# Patient Record
Sex: FEMALE | Race: BLACK OR AFRICAN AMERICAN | NOT HISPANIC OR LATINO | Employment: FULL TIME | URBAN - NONMETROPOLITAN AREA
[De-identification: names, ages, dates, MRNs, and addresses within clinical notes are randomized per-mention and may not be internally consistent; named-entity substitution may affect disease eponyms.]

---

## 2023-01-24 ENCOUNTER — HOSPITAL ENCOUNTER (EMERGENCY)
Facility: HOSPITAL | Age: 28
Discharge: HOME | End: 2023-01-24
Attending: EMERGENCY MEDICINE

## 2023-01-24 ENCOUNTER — APPOINTMENT (EMERGENCY)
Dept: CT IMAGING | Facility: HOSPITAL | Age: 28
End: 2023-01-24

## 2023-01-24 VITALS
BODY MASS INDEX: 34.99 KG/M2 | DIASTOLIC BLOOD PRESSURE: 69 MMHG | OXYGEN SATURATION: 99 % | SYSTOLIC BLOOD PRESSURE: 127 MMHG | TEMPERATURE: 97.6 F | HEART RATE: 57 BPM | HEIGHT: 65 IN | WEIGHT: 210 LBS | RESPIRATION RATE: 16 BRPM

## 2023-01-24 DIAGNOSIS — R07.9 CHEST PAIN: ICD-10-CM

## 2023-01-24 DIAGNOSIS — K11.21 PAROTITIS, ACUTE: Primary | ICD-10-CM

## 2023-01-24 DIAGNOSIS — R19.7 DIARRHEA, UNSPECIFIED TYPE: ICD-10-CM

## 2023-01-24 DIAGNOSIS — K08.9 DENTAL DISEASE: ICD-10-CM

## 2023-01-24 LAB
ALBUMIN SERPL BCP-MCNC: 4 G/DL (ref 3.5–5)
ALP SERPL-CCNC: 37 U/L (ref 34–104)
ALT SERPL W P-5'-P-CCNC: 12 U/L (ref 7–52)
ANION GAP SERPL CALCULATED.3IONS-SCNC: 5 MMOL/L (ref 4–13)
AST SERPL W P-5'-P-CCNC: 15 U/L (ref 13–39)
ATRIAL RATE: 59 BPM
BASOPHILS # BLD AUTO: 0.05 THOUSANDS/ÂΜL (ref 0–0.1)
BASOPHILS NFR BLD AUTO: 1 % (ref 0–1)
BILIRUB DIRECT SERPL-MCNC: 0.09 MG/DL (ref 0–0.2)
BILIRUB SERPL-MCNC: 0.36 MG/DL (ref 0.2–1)
BUN SERPL-MCNC: 11 MG/DL (ref 5–25)
CALCIUM SERPL-MCNC: 8.9 MG/DL (ref 8.4–10.2)
CARDIAC TROPONIN I PNL SERPL HS: <2 NG/L
CHLORIDE SERPL-SCNC: 108 MMOL/L (ref 96–108)
CO2 SERPL-SCNC: 28 MMOL/L (ref 21–32)
CREAT SERPL-MCNC: 0.8 MG/DL (ref 0.6–1.3)
CRP SERPL QL: 1.1 MG/L
EOSINOPHIL # BLD AUTO: 0.21 THOUSAND/ÂΜL (ref 0–0.61)
EOSINOPHIL NFR BLD AUTO: 2 % (ref 0–6)
ERYTHROCYTE [DISTWIDTH] IN BLOOD BY AUTOMATED COUNT: 13.9 % (ref 11.6–15.1)
ERYTHROCYTE [SEDIMENTATION RATE] IN BLOOD: 4 MM/HOUR (ref 0–19)
FLUAV RNA RESP QL NAA+PROBE: NEGATIVE
FLUBV RNA RESP QL NAA+PROBE: NEGATIVE
GFR SERPL CREATININE-BSD FRML MDRD: 99 ML/MIN/1.73SQ M
GLUCOSE SERPL-MCNC: 102 MG/DL (ref 65–140)
HCT VFR BLD AUTO: 36.8 % (ref 34.8–46.1)
HGB BLD-MCNC: 11.9 G/DL (ref 11.5–15.4)
IMM GRANULOCYTES # BLD AUTO: 0.03 THOUSAND/UL (ref 0–0.2)
IMM GRANULOCYTES NFR BLD AUTO: 0 % (ref 0–2)
LIPASE SERPL-CCNC: 10 U/L (ref 11–82)
LYMPHOCYTES # BLD AUTO: 3.78 THOUSANDS/ÂΜL (ref 0.6–4.47)
LYMPHOCYTES NFR BLD AUTO: 37 % (ref 14–44)
MAGNESIUM SERPL-MCNC: 1.9 MG/DL (ref 1.9–2.7)
MCH RBC QN AUTO: 29.3 PG (ref 26.8–34.3)
MCHC RBC AUTO-ENTMCNC: 32.3 G/DL (ref 31.4–37.4)
MCV RBC AUTO: 91 FL (ref 82–98)
MONOCYTES # BLD AUTO: 0.81 THOUSAND/ÂΜL (ref 0.17–1.22)
MONOCYTES NFR BLD AUTO: 8 % (ref 4–12)
NEUTROPHILS # BLD AUTO: 5.45 THOUSANDS/ÂΜL (ref 1.85–7.62)
NEUTS SEG NFR BLD AUTO: 52 % (ref 43–75)
NRBC BLD AUTO-RTO: 0 /100 WBCS
P AXIS: 63 DEGREES
PLATELET # BLD AUTO: 314 THOUSANDS/UL (ref 149–390)
PMV BLD AUTO: 9.4 FL (ref 8.9–12.7)
POTASSIUM SERPL-SCNC: 3.3 MMOL/L (ref 3.5–5.3)
PR INTERVAL: 170 MS
PROT SERPL-MCNC: 6.4 G/DL (ref 6.4–8.4)
QRS AXIS: 30 DEGREES
QRSD INTERVAL: 84 MS
QT INTERVAL: 434 MS
QTC INTERVAL: 429 MS
RBC # BLD AUTO: 4.06 MILLION/UL (ref 3.81–5.12)
RSV RNA RESP QL NAA+PROBE: NEGATIVE
SARS-COV-2 RNA RESP QL NAA+PROBE: NEGATIVE
SODIUM SERPL-SCNC: 141 MMOL/L (ref 135–147)
T WAVE AXIS: 29 DEGREES
VENTRICULAR RATE: 59 BPM
WBC # BLD AUTO: 10.33 THOUSAND/UL (ref 4.31–10.16)

## 2023-01-24 RX ORDER — METRONIDAZOLE 500 MG/1
500 TABLET ORAL EVERY 8 HOURS SCHEDULED
Qty: 30 TABLET | Refills: 0 | Status: SHIPPED | OUTPATIENT
Start: 2023-01-24 | End: 2023-02-03

## 2023-01-24 RX ORDER — CEFUROXIME AXETIL 500 MG/1
500 TABLET ORAL EVERY 12 HOURS SCHEDULED
Qty: 20 TABLET | Refills: 0 | Status: SHIPPED | OUTPATIENT
Start: 2023-01-24 | End: 2023-02-03

## 2023-01-24 RX ORDER — METRONIDAZOLE 500 MG/100ML
500 INJECTION, SOLUTION INTRAVENOUS ONCE
Status: COMPLETED | OUTPATIENT
Start: 2023-01-24 | End: 2023-01-24

## 2023-01-24 RX ORDER — POTASSIUM CHLORIDE 20 MEQ/1
20 TABLET, EXTENDED RELEASE ORAL ONCE
Status: COMPLETED | OUTPATIENT
Start: 2023-01-24 | End: 2023-01-24

## 2023-01-24 RX ORDER — PREDNISONE 20 MG/1
TABLET ORAL
Qty: 17 TABLET | Refills: 0 | Status: SHIPPED | OUTPATIENT
Start: 2023-01-24 | End: 2023-02-04

## 2023-01-24 RX ORDER — PREDNISONE 20 MG/1
60 TABLET ORAL ONCE
Status: COMPLETED | OUTPATIENT
Start: 2023-01-24 | End: 2023-01-24

## 2023-01-24 RX ORDER — CEFTRIAXONE 2 G/50ML
2000 INJECTION, SOLUTION INTRAVENOUS ONCE
Status: COMPLETED | OUTPATIENT
Start: 2023-01-24 | End: 2023-01-24

## 2023-01-24 RX ADMIN — IOHEXOL 85 ML: 350 INJECTION, SOLUTION INTRAVENOUS at 05:17

## 2023-01-24 RX ADMIN — CEFTRIAXONE 2000 MG: 2 INJECTION, SOLUTION INTRAVENOUS at 05:18

## 2023-01-24 RX ADMIN — METRONIDAZOLE 500 MG: 500 INJECTION, SOLUTION INTRAVENOUS at 06:06

## 2023-01-24 RX ADMIN — POTASSIUM CHLORIDE 20 MEQ: 1500 TABLET, EXTENDED RELEASE ORAL at 06:16

## 2023-01-24 RX ADMIN — PREDNISONE 60 MG: 20 TABLET ORAL at 06:16

## 2023-01-24 NOTE — ED PROVIDER NOTES
History  Chief Complaint   Patient presents with   • Facial Swelling     Pt has left lower sided swelling to jaw and neck starting around 0130  Pt also has right ear pain, headache  • Abdominal Pain     Pt started with periumbillical pain and had diarrhea with some nausea starting yesterday     HPI  27F presenting with left facial swelling  Yesterday, patient noticed headche to right side and right ear ache  This morning at around 1am, patient noticed swelling to the left side of her face  Endorses tightness behind both of her ears  Says she has dental issues for years with broken teeth and infections in the past  Has not seen a dentist in years  Denies fevers, sore throat, odynophagia  Yesterday, patient noticed upper abdominal pain  Associated w episodes of diarrhea throughout the day  +nausea  Denies vomiting  Hx of appendectomy and   LMP 23  History reviewed  No pertinent past medical history  Past Surgical History:   Procedure Laterality Date   • APPENDECTOMY     •  SECTION         History reviewed  No pertinent family history  I have reviewed and agree with the history as documented  E-Cigarette/Vaping   • E-Cigarette Use Never User      E-Cigarette/Vaping Substances     Social History     Tobacco Use   • Smoking status: Every Day     Types: Cigars   • Smokeless tobacco: Never   Vaping Use   • Vaping Use: Never used   Substance Use Topics   • Alcohol use: Not Currently   • Drug use: Yes     Types: Marijuana       Review of Systems   Constitutional: Negative for chills and fever  HENT: Positive for dental problem, ear pain and facial swelling  Negative for sore throat  Eyes: Negative for pain and visual disturbance  Respiratory: Negative for cough and shortness of breath  Cardiovascular: Negative for chest pain and palpitations  Gastrointestinal: Positive for abdominal pain, diarrhea and nausea  Negative for vomiting     Genitourinary: Negative for dysuria and hematuria  Musculoskeletal: Negative for arthralgias and back pain  Skin: Negative for color change and rash  Neurological: Negative for seizures and syncope  All other systems reviewed and are negative  Physical Exam  Physical Exam  Vitals and nursing note reviewed  Constitutional:       General: She is not in acute distress  Appearance: She is well-developed  HENT:      Head: Normocephalic and atraumatic  Comments: Left facial swelling; no induration or erythema  No purulence expressed from left Maryan's duct  Right Ear: External ear normal  No mastoid tenderness  Tympanic membrane is not injected  Left Ear: External ear normal  No mastoid tenderness  Tympanic membrane is not injected  Nose: Nose normal       Mouth/Throat:      Dentition: Abnormal dentition  Dental caries present  Comments: Floor of mouth is soft  Eyes:      Conjunctiva/sclera: Conjunctivae normal    Cardiovascular:      Rate and Rhythm: Normal rate and regular rhythm  Pulmonary:      Effort: Pulmonary effort is normal  No respiratory distress  Breath sounds: Normal breath sounds  Abdominal:      Palpations: Abdomen is soft  Tenderness: There is abdominal tenderness in the epigastric area  Musculoskeletal:      Cervical back: Normal range of motion and neck supple  Skin:     General: Skin is warm and dry  Neurological:      General: No focal deficit present  Mental Status: She is alert  Mental status is at baseline           Vital Signs  ED Triage Vitals [01/24/23 0357]   Temperature Pulse Respirations Blood Pressure SpO2   97 6 °F (36 4 °C) 74 18 116/68 99 %      Temp Source Heart Rate Source Patient Position - Orthostatic VS BP Location FiO2 (%)   Temporal Monitor Sitting Right arm --      Pain Score       No Pain           Vitals:    01/24/23 0357 01/24/23 0608 01/24/23 0700   BP: 116/68 115/58 127/69   Pulse: 74 56 57   Patient Position - Orthostatic VS: Sitting Lying Visual Acuity      ED Medications  Medications   cefTRIAXone (ROCEPHIN) IVPB (premix in dextrose) 2,000 mg 50 mL (0 mg Intravenous Stopped 1/24/23 0606)   metroNIDAZOLE (FLAGYL) IVPB (premix) 500 mg 100 mL (0 mg Intravenous Stopped 1/24/23 0636)   iohexol (OMNIPAQUE) 350 MG/ML injection (SINGLE-DOSE) 85 mL (85 mL Intravenous Given 1/24/23 0517)   potassium chloride (K-DUR,KLOR-CON) CR tablet 20 mEq (20 mEq Oral Given 1/24/23 0616)   predniSONE tablet 60 mg (60 mg Oral Given 1/24/23 0616)       Diagnostic Studies  Results Reviewed     Procedure Component Value Units Date/Time    Magnesium [[id number]]  (Normal) Collected: 01/24/23 0425    Lab Status: Final result Specimen: Blood from Arm, Right Updated: 01/24/23 9790     Magnesium 1 9 mg/dL     HS Troponin 0hr (reflex protocol) [[id number]]  (Normal) Collected: 01/24/23 0525    Lab Status: Final result Specimen: Blood from Arm, Right Updated: 01/24/23 0555     hs TnI 0hr <2 ng/L     FLU/RSV/COVID - if FLU/RSV clinically relevant [[id number]]  (Normal) Collected: 01/24/23 0425    Lab Status: Final result Specimen: Nares from Nose Updated: 01/24/23 0510     SARS-CoV-2 Negative     INFLUENZA A PCR Negative     INFLUENZA B PCR Negative     RSV PCR Negative    Narrative:      FOR PEDIATRIC PATIENTS - copy/paste COVID Guidelines URL to browser: [email or web address]    SARS-CoV-2 assay is a Nucleic Acid Amplification assay intended for the  qualitative detection of nucleic acid from SARS-CoV-2 in nasopharyngeal  swabs  Results are for the presumptive identification of SARS-CoV-2 RNA  Positive results are indicative of infection with SARS-CoV-2, the virus  causing COVID-19, but do not rule out bacterial infection or co-infection  with other viruses  Laboratories within the [place] and its  territories are required to report all positive results to the appropriate  public health authorities   Negative results do not preclude SARS-CoV-2  infection and should not be used as the sole basis for treatment or other  patient management decisions  Negative results must be combined with  clinical observations, patient history, and epidemiological information  This test has not been FDA cleared or approved  This test has been authorized by FDA under an Emergency Use Authorization  (EUA)  This test is only authorized for the duration of time the  declaration that circumstances exist justifying the authorization of the  emergency use of an in vitro diagnostic tests for detection of SARS-CoV-2  virus and/or diagnosis of COVID-19 infection under section 564(b)(1) of  the Act, 21 U  S C  032EXH-2(P)(2), unless the authorization is terminated  or revoked sooner  The test has been validated but independent review by FDA  and CLIA is pending  Test performed using [employer] GeneXpert: This RT-PCR assay targets N2,  a region unique to SARS-CoV-2  A conserved region in the E-gene was chosen  for pan-Sarbecovirus detection which includes SARS-CoV-2  According to CMS-2020-01-R, this platform meets the definition of high-throughput technology      Basic metabolic panel [[id number]]  (Abnormal) Collected: 01/24/23 0425    Lab Status: Final result Specimen: Blood from Arm, Right Updated: 01/24/23 0458     Sodium 141 mmol/L      Potassium 3 3 mmol/L      Chloride 108 mmol/L      CO2 28 mmol/L      ANION GAP 5 mmol/L      BUN 11 mg/dL      Creatinine 0 80 mg/dL      Glucose 102 mg/dL      Calcium 8 9 mg/dL      eGFR 99 ml/min/1 73sq m     Narrative:      Kimberly guidelines for Chronic Kidney Disease (CKD):   •  Stage 1 with normal or high GFR (GFR > 90 mL/min/1 73 square meters)  •  Stage 2 Mild CKD (GFR = 60-89 mL/min/1 73 square meters)  •  Stage 3A Moderate CKD (GFR = 45-59 mL/min/1 73 square meters)  •  Stage 3B Moderate CKD (GFR = 30-44 mL/min/1 73 square meters)  •  Stage 4 Severe CKD (GFR = 15-29 mL/min/1 73 square meters)  •  Stage 5 End Stage CKD (GFR <15 mL/min/1 73 square meters)  Note: GFR calculation is accurate only with a steady state creatinine    Hepatic function panel [[id number]]  (Normal) Collected: 01/24/23 0425    Lab Status: Final result Specimen: Blood from Arm, Right Updated: 01/24/23 0456     Total Bilirubin 0 36 mg/dL      Bilirubin, Direct 0 09 mg/dL      Alkaline Phosphatase 37 U/L      AST 15 U/L      ALT 12 U/L      Total Protein 6 4 g/dL      Albumin 4 0 g/dL     Lipase [[id number]]  (Abnormal) Collected: 01/24/23 0425    Lab Status: Final result Specimen: Blood from Arm, Right Updated: 01/24/23 0456     Lipase 10 u/L     C-reactive protein [[id number]]  (Normal) Collected: 01/24/23 0425    Lab Status: Final result Specimen: Blood from Arm, Right Updated: 01/24/23 0456     CRP 1 1 mg/L     Sedimentation rate, automated [[id number]]  (Normal) Collected: 01/24/23 0425    Lab Status: Final result Specimen: Blood from Arm, Right Updated: 01/24/23 0443     Sed Rate 4 mm/hour     CBC and differential [[id number]]  (Abnormal) Collected: 01/24/23 0425    Lab Status: Final result Specimen: Blood from Arm, Right Updated: 01/24/23 0436     WBC 10 33 Thousand/uL      RBC 4 06 Million/uL      Hemoglobin 11 9 g/dL      Hematocrit 36 8 %      MCV 91 fL      MCH 29 3 pg      MCHC 32 3 g/dL      RDW 13 9 %      MPV 9 4 fL      Platelets 436 Thousands/uL      nRBC 0 /100 WBCs      Neutrophils Relative 52 %      Immat GRANS % 0 %      Lymphocytes Relative 37 %      Monocytes Relative 8 %      Eosinophils Relative 2 %      Basophils Relative 1 %      Neutrophils Absolute 5 45 Thousands/µL      Immature Grans Absolute 0 03 Thousand/uL      Lymphocytes Absolute 3 78 Thousands/µL      Monocytes Absolute 0 81 Thousand/µL      Eosinophils Absolute 0 21 Thousand/µL      Basophils Absolute 0 05 Thousands/µL                CT soft tissue neck   Final Result by Demond Armstrong MD (01/24 0550)      Abnormal findings compatible with acute left parotitis  No abscess  Incidental finding of minimal multifocal maxillary and mandibular dental disease  Nonemergent dental evaluation is suggested  This study demonstrates a significant  finding and was documented as such in [facility] for liaison and referring practitioner notification  Workstation performed: [id number]                  Procedures  ECG 12 Lead Documentation Only    Date/Time: 1/24/2023 5:22 AM  Performed by: Sommer Wang MD  Authorized by: Sommer Wang MD     Interpretation:     Interpretation: normal    Rate:     ECG rate:  59    ECG rate assessment: bradycardic    Rhythm:     Rhythm: sinus rhythm    Ectopy:     Ectopy: none    QRS:     QRS axis:  Normal  Conduction:     Conduction: normal    ST segments:     ST segments:  Normal  T waves:     T waves: normal         ED Course  ED Course as of 01/24/23 0731   Tue Jan 24, 2023   0437 WBC(!): 10 33   0437 Hemoglobin: 11 9   0445 Sed Rate: 4   0454 Patient reevaluated  She is now complaining of chest heaviness  EKG w/o acute ischemic abnl  Will add on troponin  0457 Potassium(!): 3 3   0457 C-REACTIVE PROTEIN: 1 1   0551 CT Neck  IMPRESSION:  Abnormal findings compatible with acute left parotitis  No abscess      Incidental finding of minimal multifocal maxillary and mandibular dental disease  Nonemergent dental evaluation is suggested  0559 hs TnI 0hr: <2   [id number] I discussed with Dr Flower Palacio, ENT on-call  Recommends antibiotics and course of steroids for parotitis  Will see her in the office on Friday  Medical Decision Making  27F presenting with left facial swelling and abdominal pain  No stridor, no wheezing  Able to speak clearly  Tolerates own secretions  Floor of mouth is soft  No trismus  Patient has multiple dental caries, gingivitis, and poor dentition  Empirically initiated IV antibiotics for dental infection  CT neck obtained to eval for etiology of facial swelling and extent of infection  WBC minimally elevated at 10 33, Hgb wnl  CMP significant for mild hypokalemia 3 3  Normal ESR and CRP  COVID/Flu/RSV negative  CT neck significant for acute parotitis and multifocal dental disease  I discussed acute parotitis with on-call ENT; he recommended antibiotics and steroid taper and plans to see her in the office  As for the dental disease, referral to OMFS was placed and information was given for dental clinics  As for abd pain, abdomen was soft with mild tenderness in epigastrum; no peritoneal signs  WBC and CMP were reassuring  Suspect gastroenteritis related to pain and diarrhea  Patient did have intermittent episode of chest heaviness while in the ED; EKG did not show acute ischemic abnl and troponin normal at <2  Her chest heaviness resolved  Patient was able to tolerate po while in the ED  Patient comfortable with discharge  Strict return precautions were discussed  Dental disease: acute illness or injury  Parotitis, acute: acute illness or injury  Amount and/or Complexity of Data Reviewed  Labs: ordered  Decision-making details documented in ED Course  Radiology: ordered  ECG/medicine tests: ordered and independent interpretation performed  Risk  Prescription drug management  Disposition  Final diagnoses:   Parotitis, acute   Dental disease     Time reflects when diagnosis was documented in both MDM as applicable and the Disposition within this note     Time User Action Codes Description Comment    1/24/2023  5:52 AM Antonia Phalen Add [K11 21] Parotitis, acute     1/24/2023  6:17 AM Antonia Phalen Add [K08 9] Dental disease       ED Disposition     ED Disposition   Discharge    Condition   Stable    Date/Time   Tue Jan 24, 2023  6:17 AM    Comment   Nayana Walters discharge to home/self care                 Follow-up Information     Follow up With Specialties Details Why Jonny Greenberg MD Otolaryngology Go in 3 days  [address] [id number] [place] [address] [place]asserman  [phone]            Discharge Medication List as of 1/24/2023  6:37 AM      START taking these medications    Details   cefuroxime (CEFTIN) 500 mg tablet Take 1 tablet (500 mg total) by mouth every 12 (twelve) hours for 10 days, Starting Tue 1/24/2023, Until Fri 2/3/2023, Normal      metroNIDAZOLE (FLAGYL) 500 mg tablet Take 1 tablet (500 mg total) by mouth every 8 (eight) hours for 10 days, Starting Tue 1/24/2023, Until Fri 2/3/2023, Normal      predniSONE 20 mg tablet Multiple Dosages:Starting Tue 1/24/2023, Until Wed 1/25/2023 at 2359, THEN Starting Thu 1/26/2023, Until Sat 1/28/2023 at 2359, THEN Starting Sun 1/29/2023, Until Tue 1/31/2023 at 2359, THEN Starting Wed 2/1/2023, Until Fri 2/3/2023 at 2359Take 3 ta blets (60 mg total) by mouth daily for 2 days, THEN 2 tablets (40 mg total) daily for 3 days, THEN 1 tablet (20 mg total) daily for 3 days, THEN 0 5 tablets (10 mg total) daily for 3 days  , Normal                 PDMP Review     None          ED Provider  Electronically Signed by           Gisele Kennedy MD  01/24/23 0800

## 2023-01-24 NOTE — DISCHARGE INSTRUCTIONS
Referral have been placed for ENT, Dr Marco Licea, and he plans to follow-up with you in the office on Friday  Referral has also been placed for oral-maxillofacial surgery (OMFS) to see for your teeth  You received your FIRST dose of antibiotics and steroids today  You received ceftriaxone, therefore you do NOT need to start taking the CEFTIN until tomorrow  You SHOULD take the FLAGYL today for two more times (it is to be taken THREE times a day)  You also do NOT need to start taking the PREDNISONE until tomorrow  If your facial swelling worsens, or you have difficulty breathing or speaking, then please return to the ED  Do NOT drink alcohol while you are on the antibiotics  Below is a list of dental clinics that you can follow-up with your teeth     -------------------------------------    Here is a list of  dental clinics that may be able to help you  Keep in mind that these clinics do not have to see you or any other patient  Also, these clinics are not connected to the [facility] or [address] [place] [address] system but if they agree to see you as a patient it is easy for them to call  Medical Records Department to have your records faxed to them        [facility]:  [address] [facility] 130[address] [facility]   629 [facility]   [id number]:   1 [facility]   [address] [facility], [address] [place] [address]   [phone]     [facility]:  [address] [facility], 1000 [facility]  [phone]    [address]:  [address] [place], 76 [facility]  [phone]    [address]:  45 [facility], [address]  ([place]:  [address] [facility], 1310 [facility]  [phone]    [facility]:  100 [facility], 520 [facility]  [phone]    [address]:  [address] [facility] 74  [phone]    [facility]:  [facility] [place], [address] [facility]  [phone]    [facility]  [address]   [phone]    [address] [place] [address] Street:  [address] [place] [address]  21  Associates:  [address] [place] [address],[address]  [phone]

## 2023-01-24 NOTE — Clinical Note
Betty Atkinson was seen and treated in our emergency department on 1/24/2023  Diagnosis:     Nayana  may return to work on return date  She may return on this date: 01/25/2023         If you have any questions or concerns, please don't hesitate to call        Alexandre Richards MD    ______________________________           _______________          _______________  Hospital Representative                              Date                                Time

## 2023-01-24 NOTE — ED NOTES
Provided pt with warm blankets and pillows  Pt denies any other needs at this time  Call ford in reach       Heydi Del Real RN  01/24/23 6913

## 2023-01-24 NOTE — ED PROVIDER NOTES
History  Chief Complaint   Patient presents with   • Facial Swelling     Pt has left lower sided swelling to jaw and neck starting around 0130  Pt also has right ear pain, headache  • Abdominal Pain     Pt started with periumbillical pain and had diarrhea with some nausea starting yesterday     HPI  27F presenting with left facial swelling  Yesterday, patient noticed headche to right side and right ear ache  This morning at around 1am, patient noticed swelling to the left side of her face  Endorses tightness behind both of her ears  Says she has dental issues for years with broken teeth and infections in the past  Has not seen a dentist in years  Denies fevers, sore throat, odynophagia  Yesterday, patient noticed upper abdominal pain  Associated w episodes of diarrhea throughout the day  +nausea  Denies vomiting  Hx of appendectomy and   LMP 23  History reviewed  No pertinent past medical history  Past Surgical History:   Procedure Laterality Date   • APPENDECTOMY     •  SECTION         History reviewed  No pertinent family history  I have reviewed and agree with the history as documented  E-Cigarette/Vaping   • E-Cigarette Use Never User      E-Cigarette/Vaping Substances     Social History     Tobacco Use   • Smoking status: Every Day     Types: Cigars   • Smokeless tobacco: Never   Vaping Use   • Vaping Use: Never used   Substance Use Topics   • Alcohol use: Not Currently   • Drug use: Yes     Types: Marijuana       Review of Systems   Constitutional: Negative for chills and fever  HENT: Positive for dental problem, ear pain and facial swelling  Negative for sore throat  Eyes: Negative for pain and visual disturbance  Respiratory: Negative for cough and shortness of breath  Cardiovascular: Negative for chest pain and palpitations  Gastrointestinal: Positive for abdominal pain, diarrhea and nausea  Negative for vomiting     Genitourinary: Negative for dysuria and hematuria  Musculoskeletal: Negative for arthralgias and back pain  Skin: Negative for color change and rash  Neurological: Negative for seizures and syncope  All other systems reviewed and are negative  Physical Exam  Physical Exam  Vitals and nursing note reviewed  Constitutional:       General: She is not in acute distress  Appearance: She is well-developed  HENT:      Head: Normocephalic and atraumatic  Comments: Left facial swelling; no induration or erythema  No purulence expressed from left Maryan's duct  Right Ear: External ear normal  No mastoid tenderness  Tympanic membrane is not injected  Left Ear: External ear normal  No mastoid tenderness  Tympanic membrane is not injected  Nose: Nose normal       Mouth/Throat:      Dentition: Abnormal dentition  Dental caries present  Comments: Floor of mouth is soft  Eyes:      Conjunctiva/sclera: Conjunctivae normal    Cardiovascular:      Rate and Rhythm: Normal rate and regular rhythm  Pulmonary:      Effort: Pulmonary effort is normal  No respiratory distress  Breath sounds: Normal breath sounds  Abdominal:      Palpations: Abdomen is soft  Tenderness: There is abdominal tenderness in the epigastric area  Musculoskeletal:      Cervical back: Normal range of motion and neck supple  Skin:     General: Skin is warm and dry  Neurological:      General: No focal deficit present  Mental Status: She is alert  Mental status is at baseline           Vital Signs  ED Triage Vitals [01/24/23 0357]   Temperature Pulse Respirations Blood Pressure SpO2   97 6 °F (36 4 °C) 74 18 116/68 99 %      Temp Source Heart Rate Source Patient Position - Orthostatic VS BP Location FiO2 (%)   Temporal Monitor Sitting Right arm --      Pain Score       No Pain           Vitals:    01/24/23 0357 01/24/23 0608 01/24/23 0700   BP: 116/68 115/58 127/69   Pulse: 74 56 57   Patient Position - Orthostatic VS: Sitting Lying Visual Acuity      ED Medications  Medications   cefTRIAXone (ROCEPHIN) IVPB (premix in dextrose) 2,000 mg 50 mL (0 mg Intravenous Stopped 1/24/23 0606)   metroNIDAZOLE (FLAGYL) IVPB (premix) 500 mg 100 mL (0 mg Intravenous Stopped 1/24/23 0636)   iohexol (OMNIPAQUE) 350 MG/ML injection (SINGLE-DOSE) 85 mL (85 mL Intravenous Given 1/24/23 0517)   potassium chloride (K-DUR,KLOR-CON) CR tablet 20 mEq (20 mEq Oral Given 1/24/23 0616)   predniSONE tablet 60 mg (60 mg Oral Given 1/24/23 0616)       Diagnostic Studies  Results Reviewed     Procedure Component Value Units Date/Time    Magnesium [[id number]]  (Normal) Collected: 01/24/23 0425    Lab Status: Final result Specimen: Blood from Arm, Right Updated: 01/24/23 7625     Magnesium 1 9 mg/dL     HS Troponin 0hr (reflex protocol) [[id number]]  (Normal) Collected: 01/24/23 0525    Lab Status: Final result Specimen: Blood from Arm, Right Updated: 01/24/23 0555     hs TnI 0hr <2 ng/L     FLU/RSV/COVID - if FLU/RSV clinically relevant [[id number]]  (Normal) Collected: 01/24/23 0425    Lab Status: Final result Specimen: Nares from Nose Updated: 01/24/23 0510     SARS-CoV-2 Negative     INFLUENZA A PCR Negative     INFLUENZA B PCR Negative     RSV PCR Negative    Narrative:      FOR PEDIATRIC PATIENTS - copy/paste COVID Guidelines URL to browser: [email or web address]  [employer]    SARS-CoV-2 assay is a Nucleic Acid Amplification assay intended for the  qualitative detection of nucleic acid from SARS-CoV-2 in nasopharyngeal  swabs  Results are for the presumptive identification of SARS-CoV-2 RNA  Positive results are indicative of infection with SARS-CoV-2, the virus  causing COVID-19, but do not rule out bacterial infection or co-infection  with other viruses  Laboratories within the [place] and its  territories are required to report all positive results to the appropriate  public health authorities   Negative results do not preclude SARS-CoV-2  infection and should not be used as the sole basis for treatment or other  patient management decisions  Negative results must be combined with  clinical observations, patient history, and epidemiological information  This test has not been FDA cleared or approved  This test has been authorized by FDA under an Emergency Use Authorization  (EUA)  This test is only authorized for the duration of time the  declaration that circumstances exist justifying the authorization of the  emergency use of an in vitro diagnostic tests for detection of SARS-CoV-2  virus and/or diagnosis of COVID-19 infection under section 564(b)(1) of  the Act, 21 U  S C  664DRG-0(K)(0), unless the authorization is terminated  or revoked sooner  The test has been validated but independent review by FDA  and CLIA is pending  Test performed using [employer] Gene[employer]pert: This RT-PCR assay targets N2,  a region unique to SARS-CoV-2  A conserved region in the E-gene was chosen  for pan-Sarbecovirus detection which includes SARS-CoV-2  According to CMS-2020-01-R, this platform meets the definition of high-[email or web address] technology      Basic metabolic panel [[id number]]  (Abnormal) Collected: 01/24/23 0425    Lab Status: Final result Specimen: Blood from Arm, Right Updated: 01/24/23 0456     Sodium 141 mmol/L      Potassium 3 3 mmol/L      Chloride 108 mmol/L      CO2 28 mmol/L      ANION GAP 5 mmol/L      BUN 11 mg/dL      Creatinine 0 80 mg/dL      Glucose 102 mg/dL      Calcium 8 9 mg/dL      eGFR 99 ml/min/1 73sq m     Narrative:      [place]ly guidelines for Chronic Kidney Disease (CKD):   •  Stage 1 with normal or high GFR (GFR > 90 mL/min/1 73 square meters)  •  Stage 2 Mild CKD (GFR = 60-89 mL/min/1 73 square meters)  •  Stage 3A Moderate CKD (GFR = 45-59 mL/min/1 73 square meters)  •  Stage 3B Moderate CKD (GFR = 30-44 mL/min/1 73 square meters)  •  Stage 4 Severe CKD (GFR = 15-29 mL/min/1 73 square meters)  •  Stage 5 End Stage CKD (GFR <15 mL/min/1 73 square meters)  Note: GFR calculation is accurate only with a steady state creatinine    Hepatic function panel [[id number]]  (Normal) Collected: 01/24/23 0425    Lab Status: Final result Specimen: Blood from Arm, Right Updated: 01/24/23 0456     Total Bilirubin 0 36 mg/dL      Bilirubin, Direct 0 09 mg/dL      Alkaline Phosphatase 37 U/L      AST 15 U/L      ALT 12 U/L      Total Protein 6 4 g/dL      Albumin 4 0 g/dL     Lipase [[id number]]  (Abnormal) Collected: 01/24/23 0425    Lab Status: Final result Specimen: Blood from Arm, Right Updated: 01/24/23 0456     Lipase 10 u/L     C-reactive protein [[id number]]  (Normal) Collected: 01/24/23 0425    Lab Status: Final result Specimen: Blood from Arm, Right Updated: 01/24/23 0456     CRP 1 1 mg/L     Sedimentation rate, automated [[id number]]  (Normal) Collected: 01/24/23 0425    Lab Status: Final result Specimen: Blood from Arm, Right Updated: 01/24/23 0443     Sed Rate 4 mm/hour     CBC and differential [[id number]]  (Abnormal) Collected: 01/24/23 0425    Lab Status: Final result Specimen: Blood from Arm, Right Updated: 01/24/23 0436     WBC 10 33 Thousand/uL      RBC 4 06 Million/uL      Hemoglobin 11 9 g/dL      Hematocrit 36 8 %      MCV 91 fL      MCH 29 3 pg      MCHC 32 3 g/dL      RDW 13 9 %      MPV 9 4 fL      Platelets 153 Thousands/uL      nRBC 0 /100 WBCs      Neutrophils Relative 52 %      Immat GRANS % 0 %      Lymphocytes Relative 37 %      Monocytes Relative 8 %      Eosinophils Relative 2 %      Basophils Relative 1 %      Neutrophils Absolute 5 45 Thousands/µL      Immature Grans Absolute 0 03 Thousand/uL      Lymphocytes Absolute 3 78 Thousands/µL      Monocytes Absolute 0 81 Thousand/µL      Eosinophils Absolute 0 21 Thousand/µL      Basophils Absolute 0 05 Thousands/µL                CT soft tissue neck   Final Result by Maxwell Bean MD (01/24 0550)      Abnormal findings compatible with acute left parotitis  No abscess  Incidental finding of minimal multifocal maxillary and mandibular dental disease  Nonemergent dental evaluation is suggested  This study demonstrates a significant  finding and was documented as such in [facility] for liaison and referring practitioner notification  Workstation performed: [id number]                  Procedures  ECG 12 Lead Documentation Only    Date/Time: 1/24/2023 5:22 AM  Performed by: Gisele Kennedy MD  Authorized by: Gisele Kennedy MD     Interpretation:     Interpretation: normal    Rate:     ECG rate:  59    ECG rate assessment: bradycardic    Rhythm:     Rhythm: sinus rhythm    Ectopy:     Ectopy: none    QRS:     QRS axis:  Normal  Conduction:     Conduction: normal    ST segments:     ST segments:  Normal  T waves:     T waves: normal         ED Course  ED Course as of 01/24/23 0803   Tue Jan 24, 2023   0437 WBC(!): 10 33   0437 Hemoglobin: 11 9   0445 Sed Rate: 4   0454 Patient reevaluated  She is now complaining of chest heaviness  EKG w/o acute ischemic abnl  Will add on troponin  0457 Potassium(!): 3 3   0457 C-REACTIVE PROTEIN: 1 1   0551 CT Neck  IMPRESSION:  Abnormal findings compatible with acute left parotitis  No abscess      Incidental finding of minimal multifocal maxillary and mandibular dental disease  Nonemergent dental evaluation is suggested  0559 hs TnI 0hr: <2   [id number] I discussed with Dr Gregory Osborne, ENT on-call  Recommends antibiotics and course of steroids for parotitis  Will see her in the office on Friday  Medical Decision Making  27F presenting with left facial swelling and abdominal pain  No stridor, no wheezing  Able to speak clearly  Tolerates own secretions  Floor of mouth is soft  No trismus  Patient has multiple dental caries, gingivitis, and poor dentition  Empirically initiated IV antibiotics for dental infection  CT neck obtained to eval for etiology of facial swelling and extent of infection  WBC minimally elevated at 10 33, Hgb wnl  CMP significant for mild hypokalemia 3 3  Normal ESR and CRP  COVID/Flu/RSV negative  CT neck significant for acute parotitis and multifocal dental disease  I discussed acute parotitis with on-call ENT; he recommended antibiotics and steroid taper and plans to see her in the office  As for the dental disease, referral to OMFS was placed and information was given for dental clinics  As for abd pain, abdomen was soft with mild tenderness in epigastrum; no peritoneal signs  WBC and CMP were reassuring  Suspect gastroenteritis related to pain and diarrhea  Patient did have transient episode of chest pain in the ED; EKG w/o ischemic abnl and troponin normal at <2  CP resolved  Patient able to tolerate po while in the ED  Patient comfortable with discharge  Strict return precautions were discussed  Chest pain: acute illness or injury  Dental disease: acute illness or injury  Diarrhea, unspecified type: acute illness or injury  Parotitis, acute: acute illness or injury  Amount and/or Complexity of Data Reviewed  Labs: ordered  Decision-making details documented in ED Course  Radiology: ordered  Risk  Prescription drug management  Disposition  Final diagnoses:   Parotitis, acute   Dental disease   Chest pain   Diarrhea, unspecified type     Time reflects when diagnosis was documented in both MDM as applicable and the Disposition within this note     Time User Action Codes Description Comment    1/24/2023  5:52 AM Mode Nipple Add [K11 21] Parotitis, acute     1/24/2023  6:17 AM Mode Nipple Add [K08 9] Dental disease     1/24/2023  8:01 AM Mode Nipple Add [R07 9] Chest pain     1/24/2023  8:01 AM Mode Nipple Add [R19 7] Diarrhea, unspecified type       ED Disposition     ED Disposition   Discharge    Condition   Stable    Date/Time   Tue Jan 24, 2023  6:17 AM    Comment   [facility] discharge to home/self care  Follow-up Information     Follow up With Specialties Details Why Contact Info    Vargas Harvey MD Otolaryngology Go in 3 days  [place] [id number] [address]  [phone]            Discharge Medication List as of 1/24/2023  6:37 AM      START taking these medications    Details   cefuroxime (CEFTIN) 500 mg tablet Take 1 tablet (500 mg total) by mouth every 12 (twelve) hours for 10 days, Starting Tue 1/24/2023, Until Fri 2/3/2023, Normal      metroNIDAZOLE (FLAGYL) 500 mg tablet Take 1 tablet (500 mg total) by mouth every 8 (eight) hours for 10 days, Starting Tue 1/24/2023, Until Fri 2/3/2023, Normal      predniSONE 20 mg tablet Multiple Dosages:Starting Tue 1/24/2023, Until Wed 1/25/2023 at 2359, THEN Starting Thu 1/26/2023, Until Sat 1/28/2023 at 2359, THEN Starting Sun 1/29/2023, Until Tue 1/31/2023 at 2359, THEN Starting Wed 2/1/2023, Until Fri 2/3/2023 at 2359Take 3 ta blets (60 mg total) by mouth daily for 2 days, THEN 2 tablets (40 mg total) daily for 3 days, THEN 1 tablet (20 mg total) daily for 3 days, THEN 0 5 tablets (10 mg total) daily for 3 days  , Normal                 PDMP Review     None          ED Provider  Electronically Signed by           Zoe Randall MD  01/24/23 3707       Zoe Randall MD  01/24/23 7290

## 2023-03-24 ENCOUNTER — APPOINTMENT (EMERGENCY)
Dept: ULTRASOUND IMAGING | Facility: HOSPITAL | Age: 28
End: 2023-03-24

## 2023-03-24 ENCOUNTER — HOSPITAL ENCOUNTER (EMERGENCY)
Facility: HOSPITAL | Age: 28
Discharge: HOME | End: 2023-03-24
Attending: EMERGENCY MEDICINE

## 2023-03-24 VITALS
DIASTOLIC BLOOD PRESSURE: 76 MMHG | SYSTOLIC BLOOD PRESSURE: 140 MMHG | HEIGHT: 65 IN | OXYGEN SATURATION: 100 % | RESPIRATION RATE: 18 BRPM | TEMPERATURE: 97 F | HEART RATE: 67 BPM | BODY MASS INDEX: 34.12 KG/M2 | WEIGHT: 204.81 LBS

## 2023-03-24 DIAGNOSIS — R10.9 ABDOMINAL PAIN: Primary | ICD-10-CM

## 2023-03-24 LAB
ALBUMIN SERPL BCP-MCNC: 4.4 G/DL (ref 3.5–5)
ALP SERPL-CCNC: 34 U/L (ref 34–104)
ALT SERPL W P-5'-P-CCNC: 10 U/L (ref 7–52)
ANION GAP SERPL CALCULATED.3IONS-SCNC: 6 MMOL/L (ref 4–13)
AST SERPL W P-5'-P-CCNC: 13 U/L (ref 13–39)
B-HCG SERPL-ACNC: <1 MIU/ML (ref 0–11.6)
BACTERIA UR QL AUTO: ABNORMAL /HPF
BASOPHILS # BLD AUTO: 0.05 THOUSANDS/ÂΜL (ref 0–0.1)
BASOPHILS NFR BLD AUTO: 1 % (ref 0–1)
BILIRUB SERPL-MCNC: 0.4 MG/DL (ref 0.2–1)
BILIRUB UR QL STRIP: ABNORMAL
BUN SERPL-MCNC: 10 MG/DL (ref 5–25)
CALCIUM SERPL-MCNC: 9.3 MG/DL (ref 8.4–10.2)
CHLORIDE SERPL-SCNC: 107 MMOL/L (ref 96–108)
CLARITY UR: CLEAR
CO2 SERPL-SCNC: 26 MMOL/L (ref 21–32)
COLOR UR: YELLOW
CREAT SERPL-MCNC: 0.84 MG/DL (ref 0.6–1.3)
EOSINOPHIL # BLD AUTO: 0.07 THOUSAND/ÂΜL (ref 0–0.61)
EOSINOPHIL NFR BLD AUTO: 1 % (ref 0–6)
ERYTHROCYTE [DISTWIDTH] IN BLOOD BY AUTOMATED COUNT: 13.3 % (ref 11.6–15.1)
EXT PREGNANCY TEST URINE: NEGATIVE
EXT. CONTROL: NORMAL
GFR SERPL CREATININE-BSD FRML MDRD: 94 ML/MIN/1.73SQ M
GLUCOSE SERPL-MCNC: 85 MG/DL (ref 65–140)
GLUCOSE UR STRIP-MCNC: NEGATIVE MG/DL
HCT VFR BLD AUTO: 40 % (ref 34.8–46.1)
HGB BLD-MCNC: 12.9 G/DL (ref 11.5–15.4)
HGB UR QL STRIP.AUTO: ABNORMAL
IMM GRANULOCYTES # BLD AUTO: 0.03 THOUSAND/UL (ref 0–0.2)
IMM GRANULOCYTES NFR BLD AUTO: 0 % (ref 0–2)
KETONES UR STRIP-MCNC: ABNORMAL MG/DL
LEUKOCYTE ESTERASE UR QL STRIP: NEGATIVE
LYMPHOCYTES # BLD AUTO: 4.21 THOUSANDS/ÂΜL (ref 0.6–4.47)
LYMPHOCYTES NFR BLD AUTO: 39 % (ref 14–44)
MCH RBC QN AUTO: 29.5 PG (ref 26.8–34.3)
MCHC RBC AUTO-ENTMCNC: 32.3 G/DL (ref 31.4–37.4)
MCV RBC AUTO: 92 FL (ref 82–98)
MONOCYTES # BLD AUTO: 0.73 THOUSAND/ÂΜL (ref 0.17–1.22)
MONOCYTES NFR BLD AUTO: 7 % (ref 4–12)
MUCOUS THREADS UR QL AUTO: ABNORMAL
NEUTROPHILS # BLD AUTO: 5.71 THOUSANDS/ÂΜL (ref 1.85–7.62)
NEUTS SEG NFR BLD AUTO: 52 % (ref 43–75)
NITRITE UR QL STRIP: NEGATIVE
NON-SQ EPI CELLS URNS QL MICRO: ABNORMAL /HPF
NRBC BLD AUTO-RTO: 0 /100 WBCS
PH UR STRIP.AUTO: 6 [PH]
PLATELET # BLD AUTO: 322 THOUSANDS/UL (ref 149–390)
PMV BLD AUTO: 9.4 FL (ref 8.9–12.7)
POTASSIUM SERPL-SCNC: 3.4 MMOL/L (ref 3.5–5.3)
PROT SERPL-MCNC: 6.9 G/DL (ref 6.4–8.4)
PROT UR STRIP-MCNC: NEGATIVE MG/DL
RBC # BLD AUTO: 4.37 MILLION/UL (ref 3.81–5.12)
RBC #/AREA URNS AUTO: ABNORMAL /HPF
SODIUM SERPL-SCNC: 139 MMOL/L (ref 135–147)
SP GR UR STRIP.AUTO: >=1.03 (ref 1–1.03)
UROBILINOGEN UR QL STRIP.AUTO: 0.2 E.U./DL
WBC # BLD AUTO: 10.8 THOUSAND/UL (ref 4.31–10.16)
WBC #/AREA URNS AUTO: ABNORMAL /HPF

## 2023-03-24 NOTE — ED PROVIDER NOTES
History  Chief Complaint   Patient presents with   • Abdominal Pain     Pt reports lower pelvic pain and nausea, denies changes in bowel or bladder, pt states they also had "spotting" on Monday which is abnormal for them      2 days lower abdominal cramping  No vaginal bleeding or discharge  Patient states she is unsure if she is pregnant  Last menstrual period 3/7/2023  States she saw her OB/GYN who did a pelvic exam and told her it was negative  Patient is scheduled for pelvic ultrasound  but is concerned she may have ovarian cysts  Denies nausea or vomiting  Denies fevers  Denies diarrhea  Denies urinary symptoms      History provided by:  Patient   used: No    Abdominal Pain  Pain location:  LLQ, RLQ and suprapubic  Pain quality: cramping    Pain radiates to:  Does not radiate  Pain severity:  Mild  Onset quality:  Gradual  Duration:  2 days  Timing:  Constant  Progression:  Unchanged  Chronicity:  New  Relieved by:  Nothing  Worsened by:  Nothing  Ineffective treatments:  None tried  Associated symptoms: no chest pain, no chills, no constipation, no cough, no diarrhea, no dysuria, no fatigue, no fever, no hematemesis, no hematochezia, no hematuria, no nausea, no shortness of breath, no sore throat, no vaginal bleeding, no vaginal discharge and no vomiting        None       History reviewed  No pertinent past medical history  Past Surgical History:   Procedure Laterality Date   • APPENDECTOMY     •  SECTION         History reviewed  No pertinent family history  I have reviewed and agree with the history as documented      E-Cigarette/Vaping   • E-Cigarette Use Never User      E-Cigarette/Vaping Substances     Social History     Tobacco Use   • Smoking status: Every Day     Types: Cigars   • Smokeless tobacco: Never   Vaping Use   • Vaping Use: Never used   Substance Use Topics   • Alcohol use: Not Currently   • Drug use: Yes     Types: Marijuana       Review of Systems Constitutional: Negative for chills, fatigue and fever  HENT: Negative for ear pain, hearing loss, sore throat, trouble swallowing and voice change  Eyes: Negative for pain and discharge  Respiratory: Negative for cough, shortness of breath and wheezing  Cardiovascular: Negative for chest pain and palpitations  Gastrointestinal: Positive for abdominal pain  Negative for blood in stool, constipation, diarrhea, hematemesis, hematochezia, nausea and vomiting  Genitourinary: Negative for dysuria, flank pain, frequency, hematuria, vaginal bleeding and vaginal discharge  Musculoskeletal: Negative for joint swelling, neck pain and neck stiffness  Skin: Negative for rash and wound  Neurological: Negative for dizziness, seizures, syncope, facial asymmetry and headaches  Psychiatric/Behavioral: Negative for hallucinations, self-injury and suicidal ideas  All other systems reviewed and are negative  Physical Exam  Physical Exam  Vitals and nursing note reviewed  Constitutional:       General: She is not in acute distress  Appearance: She is well-developed  HENT:      Head: Normocephalic and atraumatic  Right Ear: External ear normal       Left Ear: External ear normal    Eyes:      General: No scleral icterus  Right eye: No discharge  Left eye: No discharge  Extraocular Movements: Extraocular movements intact  Conjunctiva/sclera: Conjunctivae normal    Cardiovascular:      Rate and Rhythm: Normal rate and regular rhythm  Heart sounds: Normal heart sounds  No murmur heard  Pulmonary:      Effort: Pulmonary effort is normal       Breath sounds: Normal breath sounds  No wheezing or rales  Abdominal:      General: Bowel sounds are normal  There is no distension  Palpations: Abdomen is soft  Tenderness: There is no abdominal tenderness  There is no guarding or rebound  Negative signs include Stuart's sign and McBurney's sign     Genitourinary: Comments: Deferred  Musculoskeletal:         General: No deformity  Normal range of motion  Cervical back: Normal range of motion and neck supple  Skin:     General: Skin is warm and dry  Coloration: Skin is not cyanotic or jaundiced  Findings: No rash  Neurological:      General: No focal deficit present  Mental Status: She is alert and oriented to person, place, and time  Cranial Nerves: No cranial nerve deficit  Psychiatric:         Mood and Affect: Mood normal          Behavior: Behavior normal          Thought Content:  Thought content normal          Judgment: Judgment normal          Vital Signs  ED Triage Vitals [03/24/23 1747]   Temperature Pulse Respirations Blood Pressure SpO2   (!) 97 °F (36 1 °C) 77 20 128/65 99 %      Temp Source Heart Rate Source Patient Position - Orthostatic VS BP Location FiO2 (%)   Temporal Monitor Sitting Right arm --      Pain Score       --           Vitals:    03/24/23 1747 03/24/23 1930 03/24/23 2000   BP: 128/65 131/70 140/76   Pulse: 77 69 67   Patient Position - Orthostatic VS: Sitting Sitting Lying         Visual Acuity      ED Medications  Medications - No data to display    Diagnostic Studies  Results Reviewed     Procedure Component Value Units Date/Time    Comprehensive metabolic panel [[id number]]  (Abnormal) Collected: 03/24/23 1804    Lab Status: Final result Specimen: Blood from Arm, Right Updated: 03/24/23 1848     Sodium 139 mmol/L      Potassium 3 4 mmol/L      Chloride 107 mmol/L      CO2 26 mmol/L      ANION GAP 6 mmol/L      BUN 10 mg/dL      Creatinine 0 84 mg/dL      Glucose 85 mg/dL      Calcium 9 3 mg/dL      AST 13 U/L      ALT 10 U/L      Alkaline Phosphatase 34 U/L      Total Protein 6 9 g/dL      Albumin 4 4 g/dL      Total Bilirubin 0 40 mg/dL      eGFR 94 ml/min/1 73sq m     Narrative:      [facility] guidelines for Chronic Kidney Disease (CKD):   •  Stage 1 with normal or high GFR (GFR > 90 mL/min/1 73 square meters)  •  Stage 2 Mild CKD (GFR = 60-89 mL/min/1 73 square meters)  •  Stage 3A Moderate CKD (GFR = 45-59 mL/min/1 73 square meters)  •  Stage 3B Moderate CKD (GFR = 30-44 mL/min/1 73 square meters)  •  Stage 4 Severe CKD (GFR = 15-29 mL/min/1 73 square meters)  •  Stage 5 End Stage CKD (GFR <15 mL/min/1 73 square meters)  Note: GFR calculation is accurate only with a steady state creatinine    hCG, quantitative [[id number]]  (Normal) Collected: 03/24/23 1804    Lab Status: Final result Specimen: Blood from Arm, Right Updated: 03/24/23 1848     HCG, Quant <1 mIU/mL     Narrative:       Expected Ranges:     Approximate               Approximate HCG  Gestation age          Concentration ( mIU/mL)  _____________          ______________________   Gerhard Speaks                      HCG values  0 2-1                       5-50  1-2                           2-3                         100-5000  3-4                         500-17187  4-5                         1000-92356  5-6                         [id number]  6-8                         [id number]  8-12                        [id number]      Urine Microscopic [[id number]]  (Abnormal) Collected: 03/24/23 1806    Lab Status: Final result Specimen: Urine, Clean Catch Updated: 03/24/23 1823     RBC, UA 2-4 /hpf      WBC, UA None Seen /hpf      Epithelial Cells Occasional /hpf      Bacteria, UA None Seen /hpf      MUCUS THREADS Occasional    UA w Reflex to Microscopic w Reflex to Culture [[id number]]  (Abnormal) Collected: 03/24/23 1806    Lab Status: Final result Specimen: Urine, Clean Catch Updated: 03/24/23 1814     Color, UA Yellow     Clarity, UA Clear     Specific Gravity, UA >=1 030     pH, UA 6 0     Leukocytes, UA Negative     Nitrite, UA Negative     Protein, UA Negative mg/dl      Glucose, UA Negative mg/dl      Ketones, UA 15 (1+) mg/dl      Urobilinogen, UA 0 2 E U /dl      Bilirubin, UA Small     Occult Blood, UA Small    CBC and differential [[id number]]  (Abnormal) Collected: 03/24/23 1804    Lab Status: Final result Specimen: Blood from Arm, Right Updated: 03/24/23 1810     WBC 10 80 Thousand/uL      RBC 4 37 Million/uL      Hemoglobin 12 9 g/dL      Hematocrit 40 0 %      MCV 92 fL      MCH 29 5 pg      MCHC 32 3 g/dL      RDW 13 3 %      MPV 9 4 fL      Platelets 167 Thousands/uL      nRBC 0 /100 WBCs      Neutrophils Relative 52 %      Immat GRANS % 0 %      Lymphocytes Relative 39 %      Monocytes Relative 7 %      Eosinophils Relative 1 %      Basophils Relative 1 %      Neutrophils Absolute 5 71 Thousands/µL      Immature Grans Absolute 0 03 Thousand/uL      Lymphocytes Absolute 4 21 Thousands/µL      Monocytes Absolute 0 73 Thousand/µL      Eosinophils Absolute 0 07 Thousand/µL      Basophils Absolute 0 05 Thousands/µL     POCT pregnancy, urine [[id number]]  (Normal) Resulted: 03/24/23 1804    Lab Status: Final result Updated: 03/24/23 1804     EXT Preg Test, Ur Negative     Control Valid                 US pelvis complete w transvaginal   Final Result by Claudia Tomlinson MD (03/24 1955)       No sonographic evidence for torsion  No ovarian cyst                            Workstation performed: [id number]                    Procedures  Procedures         ED Course  ED Course as of 03/24/23 2008   Fri Mar 24, 2023   1958 Urinalysis negative, lab work benign, pelvic ultrasound negative, patient remains hemodynamically stable  Appropriate for discharge at this time  Advised to keep any follow-up appointments with OB/GYN  SBIRT 22yo+    Flowsheet Row Most Recent Value   SBIRT (25 yo +)    In order to provide better care to our patients, we are screening all of our patients for alcohol and drug use  Would it be okay to ask you these screening questions?  No Filed at: 03/24/2023 1937                    Medical Decision Making  Based on the history and medical screening exam performed the differential diagnosis includes but is not limited to viral illness, gastroenteritis, constipation, gas pains, ovarian cyst, endometriosis, pregnancy  Based on the work-up performed in the emergency room which includes physical examination, and which may include laboratory studies and imaging as warranted including advanced imaging such as CT scan or ultrasound, the differential diagnosis is narrowed to exclude limb or life-threatening process  The patient is stable for discharge  Patient presents with 2 days lower abdominal pain without other symptom  In ER work-up is negative including urinalysis, lab work, pelvic ultrasound  Patient stable for discharge  Advised to keep all upcoming appointments with her OB/GYN  No indication for new prescription medications      Amount and/or Complexity of Data Reviewed  External Data Reviewed: labs, radiology and notes  Labs: ordered  Decision-making details documented in ED Course  Details: Within normal limits  Radiology: ordered and independent interpretation performed  Decision-making details documented in ED Course  Details: Pelvic ultrasound negative      Risk  Prescription drug management  Disposition  Final diagnoses:   Abdominal pain     Time reflects when diagnosis was documented in both MDM as applicable and the Disposition within this note     Time User Action Codes Description Comment    3/24/2023  8:07 PM Marsha Grajeda Add [R10 9] Abdominal pain       ED Disposition     ED Disposition   Discharge    Condition   Stable    Date/Time   Fri Mar 24, 2023  8:07 PM    760 Alex discharge to home/self care  Follow-up Information     Follow up With Specialties Details Why Contact Info    Your primary care physician   As needed           Patient's Medications    No medications on file       No discharge procedures on file      PDMP Review     None          ED Provider  Electronically Signed by           Alexia Ku MD  03/24/23 2008

## 2025-02-22 ENCOUNTER — HOSPITAL ENCOUNTER (EMERGENCY)
Facility: HOSPITAL | Age: 30
Discharge: HOME | End: 2025-02-22
Attending: EMERGENCY MEDICINE

## 2025-02-22 VITALS
BODY MASS INDEX: 36.11 KG/M2 | HEART RATE: 93 BPM | WEIGHT: 217 LBS | RESPIRATION RATE: 18 BRPM | TEMPERATURE: 98.7 F | OXYGEN SATURATION: 99 % | DIASTOLIC BLOOD PRESSURE: 54 MMHG | SYSTOLIC BLOOD PRESSURE: 113 MMHG
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
## 2025-02-22 PROCEDURE — 90472 IMMUNIZATION ADMIN EACH ADD: CPT

## 2025-02-22 PROCEDURE — 90675 RABIES VACCINE IM: CPT | Performed by: EMERGENCY MEDICINE

## 2025-02-22 PROCEDURE — 90375 RABIES IG IM/SC: CPT | Performed by: EMERGENCY MEDICINE

## 2025-02-22 PROCEDURE — 96372 THER/PROPH/DIAG INJ SC/IM: CPT

## 2025-02-22 PROCEDURE — 99283 EMERGENCY DEPT VISIT LOW MDM: CPT

## 2025-02-22 PROCEDURE — 90715 TDAP VACCINE 7 YRS/> IM: CPT | Performed by: EMERGENCY MEDICINE

## 2025-02-22 PROCEDURE — 99284 EMERGENCY DEPT VISIT MOD MDM: CPT | Performed by: EMERGENCY MEDICINE

## 2025-02-22 PROCEDURE — 90471 IMMUNIZATION ADMIN: CPT

## 2025-02-22 RX ORDER — DOXYCYCLINE 100 MG/1
100 CAPSULE ORAL 2 TIMES DAILY
Qty: 14 CAPSULE | Refills: 0 | Status: SHIPPED | OUTPATIENT
Start: 2025-02-22 | End: 2025-03-01

## 2025-02-22 RX ORDER — GINSENG 100 MG
1 CAPSULE ORAL 2 TIMES DAILY
Qty: 28 G | Refills: 0 | Status: SHIPPED | OUTPATIENT
Start: 2025-02-22

## 2025-02-22 RX ADMIN — RABIES VIRUS STRAIN PM-1503-3M ANTIGEN (PROPIOLACTONE INACTIVATED) AND WATER 1 ML: KIT at 08:10

## 2025-02-22 RX ADMIN — RABIES IMMUNE GLOBULIN (HUMAN) 2100 UNITS: 300 INJECTION, SOLUTION INFILTRATION; INTRAMUSCULAR at 08:10

## 2025-02-22 RX ADMIN — TETANUS TOXOID, REDUCED DIPHTHERIA TOXOID AND ACELLULAR PERTUSSIS VACCINE, ADSORBED 0.5 ML: 5; 2.5; 8; 8; 2.5 SUSPENSION INTRAMUSCULAR at 08:10

## 2025-02-22 NOTE — ED PROVIDER NOTES
Time reflects when diagnosis was documented in both MDM as applicable and the Disposition within this note       Time User Action Codes Description Comment    2/22/2025  7:53 AM Jerri Jenkins Add [W54.0XXA] Dog bite, initial encounter           ED Disposition       ED Disposition   Discharge    Condition   Stable    Date/Time   Sat Feb 22, 2025  7:51 AM    Comment   Nayana Aguiar discharge to home/self care.                   Assessment & Plan       Medical Decision Making  Differential diagnosis includes but not limited to: Rabies exposure, dog bite, wound infection    Risk  OTC drugs.  Prescription drug management.        ED Course as of 02/22/25 0808   Sat Feb 22, 2025   0806 Patient is tetanus updated in the ED.  Given rabies vaccine and immunoglobulin.  Educated the patient about the importance of taking antibiotics as prescribed.  She is allergic to amoxicillin so will be discharged with a prescription for doxycycline and bacitracin.  Encouraged to keep the wound clean and covered at all times.  Patient states she has no pain.  Advised to come back to the ED immediately for anything such as wound infection swelling redness or drainage.  Also advised of the rabies series that today is considered day 0 and she has subsequent 3, 7 and 14 days after today to return for remaining vaccinations.  She demonstrates understanding and agrees to comply she is advised to follow-up outpatient at urgent care or her PCP but encouraged to call to make sure they actually have the vaccine.       Medications   tetanus-diphtheria-acellular pertussis (BOOSTRIX) IM injection 0.5 mL (has no administration in time range)   rabies immune globulin, human (HyperRAB) injection 2,100 Units (has no administration in time range)   rabies vaccine, human diploid IM injection 1 mL (has no administration in time range)       ED Risk Strat Scores                            SBIRT 22yo+      Flowsheet Row Most Recent Value   Initial  Alcohol Screen: US AUDIT-C     1. How often do you have a drink containing alcohol? 0 Filed at: 2025   2. How many drinks containing alcohol do you have on a typical day you are drinking?  0 Filed at: 2025   3b. FEMALE Any Age, or MALE 65+: How often do you have 4 or more drinks on one occassion? 0 Filed at: 2025   Audit-C Score 0 Filed at: 2025   JONELLE: How many times in the past year have you...    Used an illegal drug or used a prescription medication for non-medical reasons? Never Filed at: 2025                            History of Present Illness       Chief Complaint   Patient presents with    Dog Bite     Pt was at a  and a strangers dog bit them on the left forearm, wound cleansed        No past medical history on file.   Past Surgical History:   Procedure Laterality Date    APPENDECTOMY       SECTION        No family history on file.   Social History     Tobacco Use    Smoking status: Every Day     Types: Cigars    Smokeless tobacco: Never   Vaping Use    Vaping status: Never Used   Substance Use Topics    Alcohol use: Not Currently    Drug use: Yes     Types: Marijuana      E-Cigarette/Vaping    E-Cigarette Use Never User       E-Cigarette/Vaping Substances      I have reviewed and agree with the history as documented.     This is a 29-year-old female presenting to the ED for evaluation of a dog bite that occurred yesterday morning at 11 AM.  Patient states that she was bitten on the left forearm by an unknown dog who was unprovoked.  She states that the dog belonged to a stranger and she is unsure of the vaccination status of the dog.  She did not observe the dog to have any strange behavior because shortly after the incident the dog was taken away by the owner.  Patient states that she washed the wound with water and peroxide.  She is unsure of when her tetanus was.        Review of Systems   Constitutional:  Negative for chills and  fever.   HENT:  Negative for ear pain and sore throat.    Eyes:  Negative for pain and visual disturbance.   Respiratory:  Negative for cough and shortness of breath.    Cardiovascular:  Negative for chest pain and palpitations.   Gastrointestinal:  Negative for abdominal pain and vomiting.   Genitourinary:  Negative for dysuria and hematuria.   Musculoskeletal:  Negative for arthralgias and back pain.   Skin:  Positive for wound. Negative for color change and rash.   Neurological:  Negative for seizures and syncope.   All other systems reviewed and are negative.          Objective       ED Triage Vitals [02/22/25 0651]   Temperature Pulse Blood Pressure Respirations SpO2 Patient Position - Orthostatic VS   98.7 °F (37.1 °C) 93 113/54 18 99 % Sitting      Temp src Heart Rate Source BP Location FiO2 (%) Pain Score    -- Monitor Right arm -- --      Vitals      Date and Time Temp Pulse SpO2 Resp BP Pain Score FACES Pain Rating User   02/22/25 0651 98.7 °F (37.1 °C) 93 99 % 18 113/54 -- -- SS            Physical Exam  Vitals and nursing note reviewed.   Constitutional:       General: She is not in acute distress.     Appearance: Normal appearance. She is well-developed. She is not ill-appearing.   HENT:      Head: Normocephalic and atraumatic.      Right Ear: External ear normal.      Left Ear: External ear normal.      Nose: Nose normal.      Mouth/Throat:      Mouth: Mucous membranes are moist.   Eyes:      Extraocular Movements: Extraocular movements intact.      Conjunctiva/sclera: Conjunctivae normal.   Cardiovascular:      Heart sounds: Murmur heard.   Pulmonary:      Effort: Pulmonary effort is normal. No respiratory distress.   Musculoskeletal:         General: No swelling or tenderness.      Cervical back: Normal range of motion and neck supple.      Comments: Swelling to left forearm without tenderness, full range of motion   Skin:     General: Skin is warm and dry.      Capillary Refill: Capillary refill  takes less than 2 seconds.      Comments: 1 cm wound from dog bite to left forearm no active bleeding no surrounding erythema or drainage   Neurological:      General: No focal deficit present.      Mental Status: She is alert and oriented to person, place, and time.   Psychiatric:         Mood and Affect: Mood normal.         Results Reviewed       None            No orders to display       Procedures    ED Medication and Procedure Management   None     Patient's Medications   Discharge Prescriptions    BACITRACIN TOPICAL OINTMENT 500 UNITS/G TOPICAL OINTMENT    Apply 1 large application topically 2 (two) times a day       Start Date: 2/22/2025 End Date: --       Order Dose: 1 large application       Quantity: 28 g    Refills: 0    DOXYCYCLINE MONOHYDRATE (MONODOX) 100 MG CAPSULE    Take 1 capsule (100 mg total) by mouth 2 (two) times a day for 7 days       Start Date: 2/22/2025 End Date: 3/1/2025       Order Dose: 100 mg       Quantity: 14 capsule    Refills: 0     No discharge procedures on file.  ED SEPSIS DOCUMENTATION   Time reflects when diagnosis was documented in both MDM as applicable and the Disposition within this note       Time User Action Codes Description Comment    2/22/2025  7:53 AM Jerri Jenkins [W54.0XXA] Dog bite, initial encounter                  Jerri Jenkins,   02/22/25 0808

## 2025-02-22 NOTE — ED NOTES
Animal bite form faxed to [facility]      Tania Ulloa, PRANAV  02/22/25 0871

## 2025-02-22 NOTE — DISCHARGE INSTRUCTIONS
Please return on day 3, 7 and 14 from today for subsequent series.  Take all antibiotics as prescribed.  Please follow-up with your doctor within 1 to 2 days for wound check.  If your wound becomes red and inflamed or develops drainage please return to the ER immediately.